# Patient Record
(demographics unavailable — no encounter records)

---

## 2025-05-28 NOTE — CARDIOLOGY SUMMARY
[___] : [unfilled] [de-identified] : 5/23/22 [de-identified] : 12/29/2020 [de-identified] : 12/29/2020 [de-identified] : CA score: 10/26/2020

## 2025-05-28 NOTE — DISCUSSION/SUMMARY
[FreeTextEntry1] : Dr. Hernandez-(PRIOR VISIT and PMH WITH Dr. Hernandez): This is a 73-year-old female with history of non-insulin-dependent diabetes mellitus, (insulin pump), murmur, occasional palpitations, who comes in for followup cardiac evaluation.  The patient denies chest pain, shortness of breath, dizziness or syncope. The patient was on a cruise and complaining of dyspnea on exertion with some episodes of epigastric discomfort.  The symptoms resolved with rest. Electrocardiogram done May 2024 demonstrates sinus bradycardia rate 55 bpm is otherwise unremarkable. Given this patient's cardiovascular risk profile including insulin-dependent diabetes mellitus, and coronary artery calcium score of 858 units including 469 units in left anterior descending artery and 112 units in left main artery, 217 units left circumflex artery and 60 units in right coronary artery, the patient should proceed with cardiac catheterization to define her coronary artery anatomy. The patient understands she must be on aspirin 81 mg daily. She also understands if she develops any further chest discomfort or shortness of breath she must go to emergency room immediately. She will follow-up with me after the procedure is completed.  She wishes to wait for the catheterization until next Monday.  Electrocardiogram done October 28, 2023 demonstrated normal sinus rhythm rate of 68 bpm is otherwise remarkable for poor R wave progression, left anterior hemiblock and left axis deviation. Lipid panel done April 28, 2023 demonstrated cholesterol 126, HDL 72, triglycerides of 64, LDL of 42, non-HDL cholesterol 55, LDL direct of 50 mg/dL and hemoglobin A1c of 6.9. Patient follows up with her endocrinologist on a regular basis.  Echo Doppler examination done April 28, 2023 demonstrated minimal tricuspid valve regurgitation, minimal mitral valve regurgitation, physiologic pulmonic valve regurgitation, normal ejection fraction 64%. The patient will continue on her current diet and exercise program. Coronary artery calcium score done October 26, 2020 which demonstrated a total score of 858 units including 469 units in the left anterior descending artery, 112 units in the left main coronary artery, 217 units in the left circumflex artery, and 60 units in the right coronary artery.  The patient underwent a nuclear stress test December 29, 2020 which demonstrated no evidence for myocardial ischemia.  Blood work done October 30, 2020 demonstrated hemoglobin A1c of 7.1, cholesterol 173, triglycerides of 67, HDL of 83, LDL direct of 84 mg/dL.  The patient remained stable from a cardiac standpoint.  Her LDL cholesterol goal should be less than 70 mg/dL.  She will have new blood work done today for lipid panel, and electrolytes. She is currently hemodynamically stable asymptomatic from cardiac standpoint.  The patient had a pharmacologic nuclear stress test in 2017. She has satisfactory left ventricular function, and probable breast attenuation with an ejection fraction of 81%.  Echo Doppler examination done September 1, 2017 which demonstrated normal left ventricular function with an estimated ejection fraction of 64%.  The patient understands that aerobic exercises must be increased to 40 minutes 4 times per week. A detailed discussion of lifestyle modification was done today. The patient has a good understanding of the diagnosis, and treatment plan. Lifestyle modification was also outlined.

## 2025-05-28 NOTE — ASSESSMENT
[FreeTextEntry1] : This is a 74-year-old female here today for follow-up cardiac valuation.  She has a past medical history significant for coronary artery disease s/p stent to the proximal LAD (May 2024), non-insulin-dependent diabetes mellitus, (insulin pump), murmur, occasional palpitations.  Cardiac risk factors include HLD, DM, CAD.    HPI: She is feeling generally well today and denies chest pain, dizziness, heart palpitations, recent episodes of syncope or falls, SOB, or dyspnea at this time.   Current Medications: Aspirin 81 mg daily, Clopidogrel 75 mg daily, Atorvastatin 80mg PO DAILY and Zetia 10mg PO Daily, and Repatha 140 mg injection biweekly.   Patient may stop Clopidogrel 75 mg daily at this time (1-year s/p stent placement) and will remain on ASA 81 mg daily.    She's planning a trip to [facility], [place] by [facility] in the coming weeks.   BLOOD PRESSURE: -BP is well controlled in today's visit.   BLOOD WORK:  *LDL target goal < 55* -Lipid panel done May 2025 demonstrated triglycerides 73, cholesterol 101, HDL 67, LDL 19, non-HDL 34, LDL direct 23. -Lipid panel done May 2024 demonstrated triglyceride 81, cholesterol 169, HDL 80, LDL 69, non-HDL 84, LDL direct 70. -Lipid panel done October 2023 demonstrated triglycerides 62, cholesterol 130, HDL 73, LDL 44, non-HDL 57, LDL direct 49. -New blood work was done 05/23/2022 which demonstrated normal lipid profile with a direct LDL of 49. She is diabetic with an A1c of 7.6% -Blood work was done 6/22/2021 which demonstrated normal lipid profile LDL of 30.   TESTING/REPORTS: -EKG done 05/28/2025 demonstrated regular sinus rhythm rate 62 bpm otherwise remarkable for incomplete right bundle branch block.   -Nuclear stress test done July 2024 demonstrated normal myocardial perfusion with no evidence or infarction or inducible ischemia.  -Cardiac catheterization done May 2024 demonstrated severe atherosclerosis (90% stenosis) of the pLAD s/p PCI  with ANNE MARIE.   -Electrocardiogram done May 2024 demonstrates sinus bradycardia rate 55 bpm is otherwise unremarkable.  -Echo Doppler examination done April 28, 2023 demonstrated minimal tricuspid valve regurgitation, minimal mitral valve regurgitation, physiologic pulmonic valve regurgitation, normal ejection fraction 64%.  -EKG done 04/28/2023 which demonstrated regular sinus rhythm with nonspecific ST-T wave changes BPM of 57  -EKG done 05/23/2022 which demonstrated regular sinus rhythm with nonspecific ST-T wave changes BPM of 59 with RBBB.  -EKG done Jul 21, 2021 which demonstrated regular sinus rhythm with nonspecific ST-T wave changes, BPM of 63.  -The patient had a pharmacologic nuclear stress test in 2017. She has satisfactory left ventricular function, and probable breast attenuation with an ejection fraction of 81%.  -She had an echo Doppler examination done 12/14/2020 which demonstrated normal left ventricular function with an estimated ejection fraction of 65%.  -The patient had a coronary artery calcium score done October 26, 2020 which demonstrated a total score of 858 units including 469 units in the left anterior descending artery, 112 units in the left main coronary artery, 217 units in the left circumflex artery, and 60 units in the right coronary artery.  -The patient underwent a nuclear stress test December 29, 2020 which demonstrated no evidence for myocardial ischemia.   PLAN: -She will discontinue Clopidogrel 75 mg daily  -She will continue with her other usual medications and will contact the office if she is having any complaints between now and their next follow up appointment. -Patient will schedule echocardiogram doppler examination to evaluate murmur, left ventricular function, chamber size, and rule out hypertrophy.    I have discussed the plan of care with LUIS ARMANDO CARLOS and she will follow up in 3-4 months. They are compliant with all of their medications.     The patient understands that aerobic exercises must be increased to 40 minutes 4 times/week and a detailed discussion of lifestyle modification was done today.   The patient has a good understanding of the diagnosis, treatment plan and lifestyle modification.   They will contact me at the office for any questions with their care or any changes in their health status.   The patient was discussed with supervision physician Dr. Jacob Hernandez at the time of the visit and the plan of care will be carried out as noted above.     BYRON Richmond NP